# Patient Record
Sex: FEMALE | Race: WHITE | Employment: PART TIME | URBAN - METROPOLITAN AREA
[De-identification: names, ages, dates, MRNs, and addresses within clinical notes are randomized per-mention and may not be internally consistent; named-entity substitution may affect disease eponyms.]

---

## 2018-11-14 ENCOUNTER — APPOINTMENT (OUTPATIENT)
Dept: ULTRASOUND IMAGING | Age: 28
End: 2018-11-14
Attending: PHYSICIAN ASSISTANT
Payer: MEDICAID

## 2018-11-14 ENCOUNTER — HOSPITAL ENCOUNTER (EMERGENCY)
Age: 28
Discharge: HOME | End: 2018-11-14
Attending: EMERGENCY MEDICINE
Payer: MEDICAID

## 2018-11-14 ENCOUNTER — APPOINTMENT (OUTPATIENT)
Dept: GENERAL RADIOLOGY | Age: 28
End: 2018-11-14
Attending: PHYSICIAN ASSISTANT
Payer: MEDICAID

## 2018-11-14 VITALS
RESPIRATION RATE: 16 BRPM | WEIGHT: 138 LBS | DIASTOLIC BLOOD PRESSURE: 56 MMHG | HEART RATE: 58 BPM | TEMPERATURE: 97.9 F | BODY MASS INDEX: 22.18 KG/M2 | OXYGEN SATURATION: 100 % | SYSTOLIC BLOOD PRESSURE: 109 MMHG | HEIGHT: 66 IN

## 2018-11-14 DIAGNOSIS — N23 URETERAL COLIC: Primary | ICD-10-CM

## 2018-11-14 DIAGNOSIS — G89.29 CHRONIC PELVIC PAIN IN FEMALE: ICD-10-CM

## 2018-11-14 DIAGNOSIS — Z87.442 HISTORY OF RENAL STONE: ICD-10-CM

## 2018-11-14 DIAGNOSIS — R10.2 CHRONIC PELVIC PAIN IN FEMALE: ICD-10-CM

## 2018-11-14 LAB
ALBUMIN SERPL-MCNC: 3.7 G/DL (ref 3.4–5)
ALBUMIN/GLOB SERPL: 1.1 {RATIO} (ref 0.8–1.7)
ALP SERPL-CCNC: 62 U/L (ref 45–117)
ALT SERPL-CCNC: 20 U/L (ref 13–56)
ANION GAP SERPL CALC-SCNC: 8 MMOL/L (ref 3–18)
APPEARANCE UR: ABNORMAL
AST SERPL-CCNC: 14 U/L (ref 15–37)
BASOPHILS # BLD: 0 K/UL (ref 0–0.1)
BASOPHILS NFR BLD: 1 % (ref 0–2)
BILIRUB SERPL-MCNC: 0.6 MG/DL (ref 0.2–1)
BILIRUB UR QL: NEGATIVE
BUN SERPL-MCNC: 10 MG/DL (ref 7–18)
BUN/CREAT SERPL: 15
CALCIUM SERPL-MCNC: 8 MG/DL (ref 8.5–10.1)
CHLORIDE SERPL-SCNC: 104 MMOL/L (ref 100–108)
CO2 SERPL-SCNC: 29 MMOL/L (ref 21–32)
COLOR UR: YELLOW
CREAT SERPL-MCNC: 0.67 MG/DL (ref 0.6–1.3)
DIFFERENTIAL METHOD BLD: ABNORMAL
EOSINOPHIL # BLD: 0.3 K/UL (ref 0–0.4)
EOSINOPHIL NFR BLD: 5 % (ref 0–5)
ERYTHROCYTE [DISTWIDTH] IN BLOOD BY AUTOMATED COUNT: 12.8 % (ref 11.6–14.5)
GLOBULIN SER CALC-MCNC: 3.4 G/DL (ref 2–4)
GLUCOSE SERPL-MCNC: 78 MG/DL (ref 74–99)
GLUCOSE UR STRIP.AUTO-MCNC: NEGATIVE MG/DL
HCG UR QL: NEGATIVE
HCT VFR BLD AUTO: 38.8 % (ref 35–45)
HGB BLD-MCNC: 13.2 G/DL (ref 12–16)
HGB UR QL STRIP: NEGATIVE
KETONES UR QL STRIP.AUTO: NEGATIVE MG/DL
LEUKOCYTE ESTERASE UR QL STRIP.AUTO: NEGATIVE
LIPASE SERPL-CCNC: 142 U/L (ref 73–393)
LYMPHOCYTES # BLD: 2.4 K/UL (ref 0.9–3.6)
LYMPHOCYTES NFR BLD: 37 % (ref 21–52)
MCH RBC QN AUTO: 28.8 PG (ref 24–34)
MCHC RBC AUTO-ENTMCNC: 34 G/DL (ref 31–37)
MCV RBC AUTO: 84.5 FL (ref 74–97)
MONOCYTES # BLD: 0.4 K/UL (ref 0.05–1.2)
MONOCYTES NFR BLD: 7 % (ref 3–10)
NEUTS SEG # BLD: 3.2 K/UL (ref 1.8–8)
NEUTS SEG NFR BLD: 50 % (ref 40–73)
NITRITE UR QL STRIP.AUTO: NEGATIVE
PH UR STRIP: >8.5 [PH] (ref 5–8)
PLATELET # BLD AUTO: 258 K/UL (ref 135–420)
PMV BLD AUTO: 8.8 FL (ref 9.2–11.8)
POTASSIUM SERPL-SCNC: 3.9 MMOL/L (ref 3.5–5.5)
PROT SERPL-MCNC: 7.1 G/DL (ref 6.4–8.2)
PROT UR STRIP-MCNC: NEGATIVE MG/DL
RBC # BLD AUTO: 4.59 M/UL (ref 4.2–5.3)
SODIUM SERPL-SCNC: 141 MMOL/L (ref 136–145)
SP GR UR REFRACTOMETRY: 1.02 (ref 1–1.03)
UROBILINOGEN UR QL STRIP.AUTO: 0.2 EU/DL (ref 0.2–1)
WBC # BLD AUTO: 6.4 K/UL (ref 4.6–13.2)

## 2018-11-14 PROCEDURE — 80053 COMPREHEN METABOLIC PANEL: CPT

## 2018-11-14 PROCEDURE — 96374 THER/PROPH/DIAG INJ IV PUSH: CPT

## 2018-11-14 PROCEDURE — 81003 URINALYSIS AUTO W/O SCOPE: CPT

## 2018-11-14 PROCEDURE — 85025 COMPLETE CBC W/AUTO DIFF WBC: CPT

## 2018-11-14 PROCEDURE — 83690 ASSAY OF LIPASE: CPT

## 2018-11-14 PROCEDURE — 96361 HYDRATE IV INFUSION ADD-ON: CPT

## 2018-11-14 PROCEDURE — 96375 TX/PRO/DX INJ NEW DRUG ADDON: CPT

## 2018-11-14 PROCEDURE — 74018 RADEX ABDOMEN 1 VIEW: CPT

## 2018-11-14 PROCEDURE — 74011250636 HC RX REV CODE- 250/636: Performed by: PHYSICIAN ASSISTANT

## 2018-11-14 PROCEDURE — 81025 URINE PREGNANCY TEST: CPT

## 2018-11-14 PROCEDURE — 76770 US EXAM ABDO BACK WALL COMP: CPT

## 2018-11-14 PROCEDURE — 99284 EMERGENCY DEPT VISIT MOD MDM: CPT

## 2018-11-14 PROCEDURE — 96372 THER/PROPH/DIAG INJ SC/IM: CPT

## 2018-11-14 RX ORDER — ONDANSETRON 4 MG/1
4 TABLET, FILM COATED ORAL
Qty: 12 TAB | Refills: 0 | Status: SHIPPED | OUTPATIENT
Start: 2018-11-14 | End: 2020-03-09

## 2018-11-14 RX ORDER — ONDANSETRON 2 MG/ML
4 INJECTION INTRAMUSCULAR; INTRAVENOUS
Status: COMPLETED | OUTPATIENT
Start: 2018-11-14 | End: 2018-11-14

## 2018-11-14 RX ORDER — HYDROCODONE BITARTRATE AND ACETAMINOPHEN 5; 325 MG/1; MG/1
1 TABLET ORAL
Qty: 12 TAB | Refills: 0 | Status: SHIPPED | OUTPATIENT
Start: 2018-11-14 | End: 2020-03-09

## 2018-11-14 RX ORDER — KETOROLAC TROMETHAMINE 30 MG/ML
30 INJECTION, SOLUTION INTRAMUSCULAR; INTRAVENOUS
Status: COMPLETED | OUTPATIENT
Start: 2018-11-14 | End: 2018-11-14

## 2018-11-14 RX ORDER — IBUPROFEN 600 MG/1
600 TABLET ORAL
Qty: 20 TAB | Refills: 0 | Status: SHIPPED | OUTPATIENT
Start: 2018-11-14 | End: 2020-03-09

## 2018-11-14 RX ORDER — DICYCLOMINE HYDROCHLORIDE 10 MG/ML
20 INJECTION INTRAMUSCULAR ONCE
Status: COMPLETED | OUTPATIENT
Start: 2018-11-14 | End: 2018-11-14

## 2018-11-14 RX ADMIN — SODIUM CHLORIDE 1000 ML: 900 INJECTION, SOLUTION INTRAVENOUS at 14:56

## 2018-11-14 RX ADMIN — ONDANSETRON 4 MG: 2 INJECTION INTRAMUSCULAR; INTRAVENOUS at 14:57

## 2018-11-14 RX ADMIN — DICYCLOMINE HYDROCHLORIDE 20 MG: 20 INJECTION, SOLUTION INTRAMUSCULAR at 14:57

## 2018-11-14 RX ADMIN — KETOROLAC TROMETHAMINE 30 MG: 30 INJECTION, SOLUTION INTRAMUSCULAR at 14:57

## 2018-11-14 NOTE — DISCHARGE INSTRUCTIONS
Pelvic Pain: Care Instructions  Your Care Instructions    Pelvic pain, or pain in the lower belly, can have many causes. Often pelvic pain is not serious and gets better in a few days. If your pain continues or gets worse, you may need tests and treatment. Tell your doctor about any new symptoms. These may be signs of a serious problem. Follow-up care is a key part of your treatment and safety. Be sure to make and go to all appointments, and call your doctor if you are having problems. It's also a good idea to know your test results and keep a list of the medicines you take. How can you care for yourself at home? · Rest until you feel better. Lie down, and raise your legs by placing a pillow under your knees. · Drink plenty of fluids. You may find that small, frequent sips are easier on your stomach than if you drink a lot at once. Avoid drinks with carbonation or caffeine, such as soda pop, tea, or coffee. · Try eating several small meals instead of 2 or 3 large ones. Eat mild foods, such as rice, dry toast or crackers, bananas, and applesauce. Avoid fatty and spicy foods, other fruits, and alcohol until 48 hours after your symptoms have gone away. · Take an over-the-counter pain medicine, such as acetaminophen (Tylenol), ibuprofen (Advil, Motrin), or naproxen (Aleve). Read and follow all instructions on the label. · Do not take two or more pain medicines at the same time unless the doctor told you to. Many pain medicines have acetaminophen, which is Tylenol. Too much acetaminophen (Tylenol) can be harmful. · You can put a heating pad, a warm cloth, or moist heat on your belly to relieve pain. When should you call for help?   Call your doctor now or seek immediate medical care if:    · You have a new or higher fever.     · You have unusual vaginal bleeding.     · You have new or worse belly or pelvic pain.     · You have vaginal discharge that has increased in amount or smells bad.    Watch closely for changes in your health, and be sure to contact your doctor if:    · You do not get better as expected. Where can you learn more? Go to http://jesus-sukhdeep.info/. Enter [id number] in the search box to learn more about \"Pelvic Pain: Care Instructions. \"  Current as of: May 15, 2018  Content Version: 11.8  © 6129-6818 [employer]. Care instructions adapted under license by [employer] (which disclaims liability or warranty for this information). If you have questions about a medical condition or this instruction, always ask your healthcare professional. [place] [address] any warranty or liability for your use of this information. Kidney Stone: Care Instructions  Your Care Instructions    Kidney stones are formed when salts, minerals, and other substances normally found in the urine clump together. They can be as small as grains of sand or, rarely, as large as golf balls. While the stone is traveling through the ureter, which is the tube that carries urine from the kidney to the bladder, you will probably feel pain. The pain may be mild or very severe. You may also have some blood in your urine. As soon as the stone reaches the bladder, any intense pain should go away. If a stone is too large to pass on its own, you may need a medical procedure to help you pass the stone. The doctor has checked you carefully, but problems can develop later. If you notice any problems or new symptoms, get medical treatment right away. Follow-up care is a key part of your treatment and safety. Be sure to make and go to all appointments, and call your doctor if you are having problems. It's also a good idea to know your test results and keep a list of the medicines you take. How can you care for yourself at home? · Drink plenty of fluids, enough so that your urine is light yellow or clear like water.  If you have kidney, heart, or liver disease and have to limit fluids, talk with your doctor before you increase the amount of fluids you drink. · Take pain medicines exactly as directed. Call your doctor if you think you are having a problem with your medicine. ? If the doctor gave you a prescription medicine for pain, take it as prescribed. ? If you are not taking a prescription pain medicine, ask your doctor if you can take an over-the-counter medicine. Read and follow all instructions on the label. · Your doctor may ask you to strain your urine so that you can collect your kidney stone when it passes. You can use a kitchen strainer or a tea strainer to catch the stone. Store it in a plastic bag until you see your doctor again. Preventing future kidney stones  Some changes in your diet may help prevent kidney stones. Depending on the cause of your stones, your doctor may recommend that you:  · Drink plenty of fluids, enough so that your urine is light yellow or clear like water. If you have kidney, heart, or liver disease and have to limit fluids, talk with your doctor before you increase the amount of fluids you drink. · Limit coffee, tea, and alcohol. Also avoid grapefruit juice. · Do not take more than the recommended daily dose of vitamins C and D.  · Avoid antacids such as Gaviscon, Maalox, Mylanta, or Tums. · Limit the amount of salt (sodium) in your diet. · Eat a balanced diet that is not too high in protein. · Limit foods that are high in a substance called oxalate, which can cause kidney stones. These foods include dark green vegetables, rhubarb, chocolate, wheat bran, nuts, cranberries, and beans. When should you call for help?   Call your doctor now or seek immediate medical care if:    · You cannot keep down fluids.     · Your pain gets worse.     · You have a fever or chills.     · You have new or worse pain in your back just below your rib cage (the flank area).     · You have new or more blood in your urine.    Watch closely for changes in your health, and be sure to contact your doctor if:    · You do not get better as expected. Where can you learn more? Go to http://jesus-sukhdeep.info/. Enter J263 in the search box to learn more about \"Kidney Stone: Care Instructions. \"  Current as of: March 15, 2018  Content Version: 11.8  © 4692-4034 [employer]. Care instructions adapted under license by [employer] (which disclaims liability or warranty for this information). If you have questions about a medical condition or this instruction, always ask your healthcare professional. [place] [address] any warranty or liability for your use of this information.

## 2018-11-14 NOTE — LETTER
[facility] [place] 
THE [facility] EMERGENCY DEPT 
[facility] [place] [id number] 
[phone] Work Note Date: 11/14/2018 To Whom It May concern: 
 
Sadi Walters was seen and treated today in the emergency room by the following provider(s): 
Attending Provider: Maxine Macias MD 
Physician Assistant: Lincoln Soto PA-C. Sadi Walters may return to work on 11/19/18. Sincerely, Shelbie Nguyen PA-C

## 2018-11-14 NOTE — ED PROVIDER NOTES
EMERGENCY DEPARTMENT HISTORY AND PHYSICAL EXAM 
 
Date: 11/14/2018 Patient Name: Michael Nicole History of Presenting Illness Chief Complaint Patient presents with  Flank Pain History Provided By: Patient Chief Complaint: Flank pain Duration: 4 Weeks Timing:  Worsening Location: Right flank radiating to abdomen and lower back Severity: 10 out of 10 Modifying Factors: No relief with Macrobid. Associated Symptoms: hematuria, nausea, diarrhea, and chills Additional History (Context):  
1:44 PM  
Michael Nicole is a 33 y.o. female with PMHX of kidney stones who presents to the emergency department C/O worsening right flank pain radiating to her abdomen and lower back onset 4 weeks ago. Associated sxs include hematuria, nausea, diarrhea, and chills. Pt reports she was seen at [facility] 1 week ago for same, was dx'ed with a kidney stone and rx'ed Macrobid. Pt states she finished the course of Macrobid with no relief. States she is unable to follow up with a urologist due to not having her insurance card. Pt reports long Hx of kidney stones requiring lithotripsy intervention. Reports allergy to Wellbutrin. Pt is currently on her menses. Pt denies dysuria, vomiting, constipation, fever, Hx of endometriosis, fibroids or ovarian cysts, Hx of appendectomy, and any other sxs or complaints. PCP: None Past History Past Medical History: 
Past Medical History:  
Diagnosis Date  Kidney stones Past Surgical History: 
Past Surgical History:  
Procedure Laterality Date  HX TONSILLECTOMY  HX UROLOGICAL    
 lithotripsy Family History: No family history on file. Social History: 
Social History Tobacco Use  Smoking status: Not on file Substance Use Topics  Alcohol use: Not on file  Drug use: Not on file Allergies: Allergies Allergen Reactions  Wellbutrin [Bupropion] Hives Review of Systems Review of Systems Constitutional: Positive for chills. Negative for fever. Gastrointestinal: Positive for abdominal pain, diarrhea and nausea. Negative for abdominal distention, constipation and vomiting. Genitourinary: Positive for flank pain (right) and hematuria. Negative for difficulty urinating, dysuria, frequency, urgency, vaginal bleeding and vaginal discharge. Musculoskeletal: Positive for back pain (lower). Neurological: Negative for light-headedness. All other systems reviewed and are negative. Physical Exam  
 
Vitals:  
 11/14/18 1337 BP: 109/56 Pulse: (!) 58 Resp: 16 Temp: 97.9 °F (36.6 °C) SpO2: 100% Weight: 62.6 kg (138 lb) Height: 5' 6\" (1.676 m) Physical Exam  
Constitutional: She is oriented to person, place, and time. She appears well-developed and well-nourished. No distress.  female in NAD. Alert. Appears anxious and uncomfortable at times. HENT:  
Head: Normocephalic and atraumatic. Right Ear: External ear normal.  
Left Ear: External ear normal.  
Nose: Nose normal.  
Mouth/Throat: Oropharynx is clear and moist and mucous membranes are normal.  
Eyes: Conjunctivae are normal. No scleral icterus. Neck: Normal range of motion. Cardiovascular: Normal rate, regular rhythm, normal heart sounds and intact distal pulses. Exam reveals no gallop and no friction rub. No murmur heard. Pulmonary/Chest: Effort normal and breath sounds normal. No accessory muscle usage. No tachypnea. No respiratory distress. She has no decreased breath sounds. She has no wheezes. She has no rhonchi. She has no rales. Abdominal: Soft. Normal appearance and bowel sounds are normal. She exhibits no ascites and no mass. There is tenderness. There is CVA tenderness (right flank). There is no rigidity, no guarding and no tenderness at McBurney's point. Neurological: She is alert and oriented to person, place, and time. Skin: Skin is warm and dry. She is not diaphoretic. Psychiatric: Judgment normal. Her mood appears anxious. Nursing note and vitals reviewed. Diagnostic Study Results Labs - No results found for this or any previous visit (from the past 12 hour(s)). Radiologic Studies -  
US RETROPERITONEUM COMP Final Result IMPRESSION: 
Normal appearance of bilateral kidneys without evidence of obstructive 
nephropathy.   
 
As read by the radiologist. 
  
XR ABD (KUB) Final Result Impression: 
Nonobstructive bowel gas pattern. No evidence of acute abnormality. As read by the radiologist.  
 
CT Results  (Last 48 hours) None CXR Results  (Last 48 hours) None Medications given in the ED- Medications  
ketorolac (TORADOL) injection 30 mg (30 mg IntraVENous Given 11/14/18 1457) dicyclomine (BENTYL) 10 mg/mL injection 20 mg (20 mg IntraMUSCular Given 11/14/18 1457) ondansetron [facility]) injection 4 mg (4 mg IntraVENous Given 11/14/18 1457)  
sodium chloride 0.9 % bolus infusion 1,000 mL (0 mL IntraVENous IV Completed 11/14/18 1537) Medical Decision Making I am the first provider for this patient. I reviewed the vital signs, available nursing notes, past medical history, past surgical history, family history and social history. Vital Signs-Reviewed the patient's vital signs. Pulse Oximetry Analysis - 100% on RA Records Reviewed: Nursing Notes and Old Medical Records Seen at [facility] 11/3/18 for same. Was complaining of right sided flank pain. Was very tearful. Hx of stones with lithotripsy in 2016. Has a Urologist in [place]. CT without contrast showed no hydro or ureteral stones. Appendix was normal. Otherwise normal exam. Was dxed with UTI and placed on Macrobid. Blood work was unremarkable. Urine culture grew 20,000 CFUs E.coli that was pan sensitive. Provider Notes (Medical Decision Making): UTI/pyelo, stone, renal infarct, MSK, ovarian cyst, fibroid. Doubt torsion or TOA. Doubt appy. Procedures: 
Procedures ED Course:  
1:44 PM Initial assessment performed. The patients presenting problems have been discussed, and they are in agreement with the care plan formulated and outlined with them. I have encouraged them to ask questions as they arise throughout their visit. 3:25 PM Pain has improved form 10/10 to 6/10. Does not appear to be any renal stone or evidence of hydro. UA is unremarkable. Labs reassuring. Pt states this pain comes typically around her menses. ? endometeriosis. Workup including CT at [place] reviewed and unremarkable for acute process. Will give GYN follow up and discharge home. Reasons to RTED discussed with pt. All questions answered. Pt feels comfortable going home at this time. Pt expressed understanding and she agrees with plan. Diagnosis and Disposition DISCHARGE NOTE: 
3:25 PM  
Randa Aguirre's  results have been reviewed with her. She has been counseled regarding her diagnosis, treatment, and plan. She verbally conveys understanding and agreement of the signs, symptoms, diagnosis, treatment and prognosis and additionally agrees to follow up as discussed. She also agrees with the care-plan and conveys that all of her questions have been answered. I have also provided discharge instructions for her that include: educational information regarding their diagnosis and treatment, and list of reasons why they would want to return to the ED prior to their follow-up appointment, should her condition change. She has been provided with education for proper emergency department utilization. CLINICAL IMPRESSION: 
 
1. Ureteral colic 2. Chronic pelvic pain in female 3. History of renal stone PLAN: 
1. D/C Home 2. Discharge Medication List as of 11/14/2018  3:28 PM  
  
START taking these medications Details HYDROcodone-acetaminophen (NORCO) 5-325 mg per tablet Take 1 Tab by mouth every four (4) hours as needed for Pain. Max Daily Amount: 6 Tabs., Print, Disp-12 Tab, R-0  
  
ibuprofen (MOTRIN) 600 mg tablet Take 1 Tab by mouth every six (6) hours as needed for Pain. Take with food. , Print, Disp-20 Tab, R-0  
  
ondansetron hcl (ZOFRAN) 4 mg tablet Take 1 Tab by mouth every eight (8) hours as needed for Nausea. , Print, Disp-12 Tab, R-0  
  
  
 
3. Follow-up Information Follow up With Specialties Details Why Contact Info Devonte Cristobal MD Obstetrics & Gynecology, Gynecology, Obstetrics Schedule an appointment as soon as possible for a visit in 3 days For GYN follow up. [address] 
[phone] Yoli Rodríguez MD Internal Medicine Schedule an appointment as soon as possible for a visit in 3 days For primary care follow up. [address] [place] [address] 
[phone] [facility] EMERGENCY DEPT Emergency Medicine Go to As needed, If symptoms worsen [address] [address] 
[phone]  
  
 
_______________________________ Attestations: This note is prepared by Tino Ayers, acting as Scribe for Hackers / FoundersBENJAMIN. [employer], BENJAMIN:  The scribe's documentation has been prepared under my direction and personally reviewed by me in its entirety. I confirm that the note above accurately reflects all work, treatment, procedures, and medical decision making performed by me. 
_______________________________

## 2018-11-14 NOTE — ED TRIAGE NOTES
C/o flank pain and abd pain, seen recently at [facility] with kidney stone, denies having a f/u due to not having an insurance card.

## 2020-03-09 ENCOUNTER — APPOINTMENT (OUTPATIENT)
Dept: GENERAL RADIOLOGY | Age: 30
End: 2020-03-09
Attending: EMERGENCY MEDICINE
Payer: MEDICAID

## 2020-03-09 ENCOUNTER — HOSPITAL ENCOUNTER (EMERGENCY)
Age: 30
Discharge: HOME | End: 2020-03-09
Attending: EMERGENCY MEDICINE
Payer: MEDICAID

## 2020-03-09 ENCOUNTER — APPOINTMENT (OUTPATIENT)
Dept: CT IMAGING | Age: 30
End: 2020-03-09
Attending: EMERGENCY MEDICINE
Payer: MEDICAID

## 2020-03-09 VITALS
OXYGEN SATURATION: 99 % | HEIGHT: 66 IN | RESPIRATION RATE: 18 BRPM | WEIGHT: 123 LBS | DIASTOLIC BLOOD PRESSURE: 56 MMHG | HEART RATE: 59 BPM | SYSTOLIC BLOOD PRESSURE: 137 MMHG | TEMPERATURE: 97.5 F | BODY MASS INDEX: 19.77 KG/M2
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
## 2020-03-09 DIAGNOSIS — S59.902A ELBOW INJURY, LEFT, INITIAL ENCOUNTER: ICD-10-CM

## 2020-03-09 DIAGNOSIS — S16.1XXA NECK STRAIN, INITIAL ENCOUNTER: ICD-10-CM

## 2020-03-09 PROCEDURE — 99283 EMERGENCY DEPT VISIT LOW MDM: CPT

## 2020-03-09 PROCEDURE — 73080 X-RAY EXAM OF ELBOW: CPT

## 2020-03-09 PROCEDURE — 72125 CT NECK SPINE W/O DYE: CPT

## 2020-03-09 PROCEDURE — 74011250637 HC RX REV CODE- 250/637: Performed by: EMERGENCY MEDICINE

## 2020-03-09 PROCEDURE — 74011250636 HC RX REV CODE- 250/636: Performed by: EMERGENCY MEDICINE

## 2020-03-09 PROCEDURE — 96372 THER/PROPH/DIAG INJ SC/IM: CPT

## 2020-03-09 PROCEDURE — 70450 CT HEAD/BRAIN W/O DYE: CPT

## 2020-03-09 PROCEDURE — L0140 CERVICAL SEMI-RIGID ADJUSTAB: HCPCS

## 2020-03-09 RX ORDER — LIDOCAINE 4 G/100G
1 PATCH TOPICAL EVERY 12 HOURS
Qty: 10 PATCH | Refills: 0 | Status: SHIPPED | OUTPATIENT
Start: 2020-03-09

## 2020-03-09 RX ORDER — TIZANIDINE HYDROCHLORIDE 2 MG/1
2 CAPSULE, GELATIN COATED ORAL
Qty: 15 CAP | Refills: 0 | Status: SHIPPED | OUTPATIENT
Start: 2020-03-09

## 2020-03-09 RX ORDER — KETOROLAC TROMETHAMINE 30 MG/ML
60 INJECTION, SOLUTION INTRAMUSCULAR; INTRAVENOUS
Status: COMPLETED | OUTPATIENT
Start: 2020-03-09 | End: 2020-03-09

## 2020-03-09 RX ORDER — TIZANIDINE 2 MG/1
2 TABLET ORAL
Status: COMPLETED | OUTPATIENT
Start: 2020-03-09 | End: 2020-03-09

## 2020-03-09 RX ORDER — NAPROXEN 500 MG/1
500 TABLET ORAL 2 TIMES DAILY WITH MEALS
Qty: 28 TAB | Refills: 0 | Status: SHIPPED | OUTPATIENT
Start: 2020-03-09 | End: 2020-03-23

## 2020-03-09 RX ADMIN — TIZANIDINE 2 MG: 2 TABLET ORAL at 13:26

## 2020-03-09 RX ADMIN — KETOROLAC TROMETHAMINE 60 MG: 30 INJECTION, SOLUTION INTRAMUSCULAR at 13:29

## 2020-03-09 NOTE — ED TRIAGE NOTES
Triage: pt was walking down stairs when she states that her Alicia Fulton went out. \" Pt fell down approx 6 stairs. Witness denies LOC. Pt complains of cervical neck pain, L elbow pain. C-collar placed in triage.

## 2020-03-09 NOTE — ED PROVIDER NOTES
EMERGENCY DEPARTMENT HISTORY AND PHYSICAL EXAM    Date: 3/9/2020  Patient Name: Debra Hicks    History of Presenting Illness     Chief Complaint   Patient presents with   [facility] Fall         History Provided By: Patient    Debra Hicks is a 30 y.o. female who presents to the emergency department C/O near syncope, fall. Patient states she was walking down some stairs when she felt lightheaded and her [employer] went out \"and fell down approximately 6 steps. Mother states she was there and witnessed the fall and did not pass out but did note that she hit the left side of her neck and back of her head as well as her left elbow. Patient localizes her pain to those regions and does admit to mild numbness and tingling sensation down her right arm but denies any weakness. Denies any shoulder pain, wrist pain or hand pain. Denies any other injury. States she does have problems with passing out frequently. PCP: None    Current Outpatient Medications   Medication Sig Dispense Refill    naproxen (NAPROSYN) 500 mg tablet Take 1 Tab by mouth two (2) times daily (with meals) for 14 days. 28 Tab 0    tiZANidine (ZANAFLEX) 2 mg capsule Take 1 Cap by mouth three (3) times daily as needed (muscle spasm). 15 Cap 0    lidocaine 4 % patch 1 Patch by TransDERmal route every twelve (12) hours every twelve (12) hours. 10 Patch 0       Past History     Past Medical History:  Past Medical History:   Diagnosis Date    Asthma     Fibromyalgia     Kidney stones        Past Surgical History:  Past Surgical History:   Procedure Laterality Date    HX TONSILLECTOMY      HX UROLOGICAL      lithotripsy       Family History:  History reviewed. No pertinent family history. Social History:  Social History     Tobacco Use    Smoking status: Never Smoker    Smokeless tobacco: Never Used   Substance Use Topics    Alcohol use: Not Currently    Drug use: Not on file       Allergies:   Allergies   Allergen Reactions    Ceftriaxone Hives    Wellbutrin [Bupropion] Hives         Review of Systems   Review of Systems   Musculoskeletal: Positive for arthralgias, myalgias and neck pain. Neurological: Positive for headaches. All other systems reviewed and are negative. Physical Exam     Vitals:    03/09/20 1236   BP: 137/56   Pulse: (!) 59   Resp: 18   Temp: 97.5 °F (36.4 °C)   SpO2: 99%   Weight: 55.8 kg (123 lb)   Height: 5' 6\" (1.676 m)     Physical Exam    Nursing notes and vital signs reviewed    Airway: intact, speaking normally  Breathing: No apparent distress, no cyanosis  Circulation: Peripheral pulses equal    Constitutional: Non toxic appearing, no acute distress, cervical collar in place  HEENT & Neck: Normocephalic, Atraumatic, PERRL, EOMI, No rhinorrhea, external nose normal, external ears normal, mucous membranes moist, No stridor, No JVD  Cardiovascular: Regular rate and rhythm, no murmurs, tenderness to the cervical paraspinal musculature bilaterally, worse on the left, no midline step-off or deformity  Chest: Normal work of breathing and chest excursion bilaterally  Lungs: Clear to ausculation bilaterally  Abdomen: Soft, non tender, non distended, normoactive bowel sounds, No rigidity, no peritoneal signs  Musculoskeletal: No evidence of trauma or deformity to the back  Extremities: No evidence of trauma or deformity, no LE edema, tenderness over the left elbow without overlying contusion or ecchymosis. Patient has full passive range of motion. No shoulder pain or wrist pain  Skin: Warm, No obvious rashes  Neuro: Alert and oriented x 3, CN 2-12 intact, normal speech, strength and sensation full and symmetric bilaterally, normal coordination  Psychiatric: Normal mood and affect      Diagnostic Study Results     Labs -   No results found for this or any previous visit (from the past 72 hour(s)). Radiologic Studies -   CT SPINE CERV WO CONT   Final Result   IMPRESSION:         1.  Mild straightening of usual cervical lordosis without evidence of fracture or   acute hepatic listhesis. CT HEAD WO CONT   Final Result   IMPRESSION:      No acute intracranial abnormality. XR ELBOW LT MIN 3 V   Final Result   IMPRESSION:      Normal examination. CT Results  (Last 48 hours)               03/09/20 1445  CT SPINE CERV WO CONT Final result    Impression:  IMPRESSION:           1. Mild straightening of usual cervical lordosis without evidence of fracture or   acute hepatic listhesis. Narrative:  EXAM: CT Cervical spine       INDICATION: Fall with cervical neck pain       COMPARISON: No prior study. TECHNIQUE: Axial CT imaging of the cervical spine was performed from the skull   base to the upper thoracic spine without intravenous contrast. Multiplanar   reformats were generated. One or more dose reduction techniques were used on this CT: automated exposure   control, adjustment of the mAs and/or kVp according to patient size, and   iterative reconstruction techniques. The specific techniques used on this CT   exam have been documented in the patient's electronic medical record. Digital   Imaging and Communications in Medicine (DICOM) format image data are available   to nonaffiliated external healthcare facilities or entities on a secure, media   free, reciprocally searchable basis with patient authorization for at least a   12-month period after this study. _______________       FINDINGS:       VERTEBRAE AND DISCS: Coronal reformatted images demonstrate an intact and normal   appearance to the occipital condyles. Atlantodental and atlantooccipital   articulations are within normal limits. Mild straightening of usual cervical   lordosis is noted. No listhesis. Vertebral body heights are normal.   Intervertebral disc spaces are preserved. No displaced fracture. Facet joints   are normally aligned. SPINAL CANAL AND FORAMINA: No high grade spinal canal or foramina stenosis is   seen. PREVERTEBRAL SOFT TISSUES: There is no abnormal prevertebral soft tissue   swelling. VISIBLE INTRACRANIAL CONTENTS: Unremarkable. LUNG APICES: Clear. OTHER: None.       _______________           03/09/20 1444  CT HEAD WO CONT Final result    Impression:  IMPRESSION:       No acute intracranial abnormality. Narrative:  EXAM: CT head       INDICATION: Fall with occipital headache       COMPARISON: None. TECHNIQUE: Axial CT imaging of the head was performed without intravenous   contrast. Standard multiplanar coronal and sagittal reformatted images were   obtained and are included in interpretation. One or more dose reduction techniques were used on this CT: automated exposure   control, adjustment of the mAs and/or kVp according to patient size, and   iterative reconstruction techniques. The specific techniques used on this CT   exam have been documented in the patient's electronic medical record. Digital   Imaging and Communications in Medicine (DICOM) format image data are available   to nonaffiliated external healthcare facilities or entities on a secure, media   free, reciprocally searchable basis with patient authorization for at least a   12-month period after this study. _______________       FINDINGS:       BRAIN AND POSTERIOR FOSSA: The sulci, folia, ventricles and basal cisterns are   within normal limits for the patient?s age. There is no intracranial hemorrhage,   mass effect, or midline shift. There are no areas of abnormal parenchymal   attenuation. EXTRA-AXIAL SPACES AND MENINGES: There are no abnormal extra-axial fluid   collections. CALVARIUM: Intact. SINUSES: Clear.        OTHER: None.       _______________               CXR Results  (Last 48 hours)    None          Medications given in the ED-  Medications   ketorolac tromethamine (TORADOL) 60 mg/2 mL injection 60 mg (60 mg IntraMUSCular Given 3/9/20 132)   tiZANidine (ZANAFLEX) tablet 2 mg (2 mg Oral Given 3/9/20 4517)         Medical Decision Making   I am the first provider for this patient. I reviewed the vital signs, available nursing notes, past medical history, past surgical history, family history and social history. Vital Signs-Reviewed the patient's vital signs. Records Reviewed: Nursing Notes    Procedures:  Procedures    ED Course:   Considering her fall will obtain CT of the head and cervical spine as well as x-ray of the elbow and give Toradol and Zanaflex for her symptoms    CT normal. Elbow x ray normal  Recommended to continue with pain meds and muscle relaxants as directed for cervical and elbow strain and follow up with PCP. She understands and agrees to plan. Diagnosis and Disposition       DISCHARGE NOTE:    Ev Sanders  results have been reviewed with her. She has been counseled regarding her diagnosis, treatment, and plan. She verbally conveys understanding and agreement of the signs, symptoms, diagnosis, treatment and prognosis and additionally agrees to follow up as discussed. She also agrees with the care-plan and conveys that all of her questions have been answered. I have also provided discharge instructions for her that include: educational information regarding their diagnosis and treatment, and list of reasons why they would want to return to the ED prior to their follow-up appointment, should her condition change. She has been provided with education for proper emergency department utilization. CLINICAL IMPRESSION:    1. Fall down stairs, initial encounter    2. Neck strain, initial encounter    3. Elbow injury, left, initial encounter        PLAN:  1. D/C Home  2. Current Discharge Medication List      START taking these medications    Details   naproxen (NAPROSYN) 500 mg tablet Take 1 Tab by mouth two (2) times daily (with meals) for 14 days.   Qty: 28 Tab, Refills: 0      tiZANidine (ZANAFLEX) 2 mg capsule Take 1 Cap by mouth three (3) times daily as needed (muscle spasm). Qty: 15 Cap, Refills: 0      lidocaine 4 % patch 1 Patch by TransDERmal route every twelve (12) hours every twelve (12) hours. Qty: 10 Patch, Refills: 0           3. Follow-up Information     Follow up With Specialties Details Why [address] [place] [address]  Schedule an appointment as soon as possible for a visit  As needed [address] [address]  [phone]        _______________________________      Please note that this dictation was completed with [employer], the computer voice recognition software. Quite often unanticipated grammatical, syntax, homophones, and other interpretive errors are inadvertently transcribed by the computer software. Please disregard these errors. Please excuse any errors that have escaped final proofreading.

## 2020-03-09 NOTE — LETTER
[facility] [place] 
[facility] EMERGENCY DEPT 
[address] [address] [phone] Work/School Note Date: 3/9/2020 To Whom It May concern: 
 
Isaias Rodriguez was seen and treated today in the emergency room by the following provider(s): 
Attending Provider: Jefry Rosales DO. Isaias Rodriguez may return to work on 3/11/2020.  
 
Sincerely, 
 
 
 
 
Soila Pozo DO